# Patient Record
Sex: FEMALE | Race: WHITE
[De-identification: names, ages, dates, MRNs, and addresses within clinical notes are randomized per-mention and may not be internally consistent; named-entity substitution may affect disease eponyms.]

---

## 2020-04-07 ENCOUNTER — HOSPITAL ENCOUNTER (EMERGENCY)
Dept: HOSPITAL 72 - EMR | Age: 25
Discharge: HOME | End: 2020-04-07
Payer: COMMERCIAL

## 2020-04-07 VITALS — BODY MASS INDEX: 20.89 KG/M2 | WEIGHT: 130 LBS | HEIGHT: 66 IN

## 2020-04-07 VITALS — SYSTOLIC BLOOD PRESSURE: 118 MMHG | DIASTOLIC BLOOD PRESSURE: 76 MMHG

## 2020-04-07 VITALS — DIASTOLIC BLOOD PRESSURE: 76 MMHG | SYSTOLIC BLOOD PRESSURE: 112 MMHG

## 2020-04-07 DIAGNOSIS — R05: ICD-10-CM

## 2020-04-07 DIAGNOSIS — J45.901: Primary | ICD-10-CM

## 2020-04-07 DIAGNOSIS — R50.9: ICD-10-CM

## 2020-04-07 DIAGNOSIS — Z91.013: ICD-10-CM

## 2020-04-07 DIAGNOSIS — Z20.828: ICD-10-CM

## 2020-04-07 PROCEDURE — 99282 EMERGENCY DEPT VISIT SF MDM: CPT

## 2020-04-07 NOTE — EMERGENCY ROOM REPORT
History of Present Illness


General


Chief Complaint:  Dyspnea/Respdistress


Source:  Patient





Present Illness


HPI


Patient is a 24-year-old female presents after increased cough.  Been having 

subjective fevers.  Reports having sick contacts at home including boyfriend 

who is tested positive for corona virus.  Patient states that she had not been 

having any dizziness or lightheadedness.  She denies any chest pain.  Reports 

having some throat soreness as well as nonproductive cough.  Denies any other 

current symptoms.  Had previous history of asthma.  Denies any leg pain or 

swelling.


Allergies:  


Coded Allergies:  


     SHELLFISH DERIVED (Verified  Allergy, Severe, Anaphylaxis, 4/7/20)





COVID-19 Screening


Contact w/high risk pt:  Yes


Recent Travel to affected area:  No


Experienced COVID-19 symptoms?:  Yes


COVID-19 symptoms experienced:  Shortness of Breath, Cough





Patient History


Past Medical History:  see triage record


Last Menstrual Period:  last week


Pregnant Now:  No


Reviewed Nursing Documentation:  PMH: Agreed; PSxH: Agreed





Nursing Documentation-PMH


Past Medical History:  No History, Except For


Hx Asthma:  Yes


History Of Psychiatric Problem:  Yes





Review of Systems


All Other Systems:  negative except mentioned in HPI





Physical Exam





Vital Signs








  Date Time  Temp Pulse Resp B/P (MAP) Pulse Ox O2 Delivery O2 Flow Rate FiO2


 


4/7/20 09:52 99.0 97 20 118/76 (90) 97 Room Air  








General Appearance:  well appearing, no apparent distress, alert, GCS 15


Head:  normocephalic, atraumatic


ENT:  hearing grossly normal, normal voice


Neck:  full range of motion, supple


Respiratory:  normal inspection, no respiratory distress, speaking full 

sentences


Cardiovascular #1:  normal peripheral pulses


Gastrointestinal:  normal bowel sounds, soft


Musculoskeletal:  normal inspection


Neurologic:  alert, motor strength/tone normal, CNs III-XII nml as tested, 

oriented x3, normal gait


Psychiatric:  normal inspection, mood/affect normal


Skin:  no rash





Medical Decision Making


Diagnostic Impression:  


 Primary Impression:  


 Asthma exacerbation


 Additional Impression:  


 Suspected 2019 novel coronavirus infection


ER Course


Patient presented for cough.  Differential diagnosis include was not limited to 

viral respiratory infection, upper respiratory infection, bronchitis, pneumonia 

among others.  Patient has a benign exam and does not appear to require any 

imaging or laboratory testing at this time.   Does not appear to be in any 

respiratory distress.  Oxygen saturation is normal and patient is currently 

afebrile.  Given patient's recent sick contacts with no corona viral infection 

patient likely has coronavirus.  Is not taking antipyretics.  Patient was noted 

to have symptoms consistent with a viral respiratory infection.  Patient was 

advised to self quarantine.  Was advised to return if  began having fever 

increased difficulty breathing or other concerns.  The patient is advised to 

follow up with  primary care doctor  for recheck.  Patient is advised to return 

if any worsening condition or if any changes in status that are concerning.





Last Vital Signs








  Date Time  Temp Pulse Resp B/P (MAP) Pulse Ox O2 Delivery O2 Flow Rate FiO2


 


4/7/20 10:15  86 20 112/76 97 Room Air  


 


4/7/20 10:05 99.0       








Status:  improved


Disposition:  HOME, SELF-CARE


Condition:  Stable


Scripts


Loratadine (LORATADINE) 10 Mg Tablet


10 MG PO DAILY, #30 TAB


   Prov: Wally Castanon MD         4/7/20 


Albuterol Sulfate* (ALBUTEROL SULFATE HHN*) 2.5 Mg/3 Ml Vial.neb


2.5 MG HHN Q4H PRN for Shortness of Breath, #25 VIAL


   Prov: Wally Castanon MD         4/7/20


Referrals:  


NOT CHOSEN [facility]/MD,REFERRING (PCP)


Patient Instructions:  Asthma, Adult, Viral Respiratory Infection





Additional Instructions:  


Return if increased Shortness of breath, dizziness or other concerns.











Wally Castanon MD Apr 7, 2020 11:39

## 2020-04-07 NOTE — NUR
ED Nurse Note: presents as per triage note.  no dyspnea upon arrival. speaks 
full sentences well.  no active coughing.  relates boyfriend she lives with is 
covid + test.  pt states she does not have albuterol mdi at home.